# Patient Record
Sex: MALE | Race: BLACK OR AFRICAN AMERICAN
[De-identification: names, ages, dates, MRNs, and addresses within clinical notes are randomized per-mention and may not be internally consistent; named-entity substitution may affect disease eponyms.]

---

## 2017-03-09 ENCOUNTER — HOSPITAL ENCOUNTER (EMERGENCY)
Dept: HOSPITAL 62 - ER | Age: 32
LOS: 1 days | Discharge: HOME | End: 2017-03-10
Payer: COMMERCIAL

## 2017-03-09 DIAGNOSIS — L02.01: Primary | ICD-10-CM

## 2017-03-09 DIAGNOSIS — F17.210: ICD-10-CM

## 2017-03-09 DIAGNOSIS — R22.0: ICD-10-CM

## 2017-03-09 LAB
ALBUMIN SERPL-MCNC: 4.5 G/DL (ref 3.5–5)
ALP SERPL-CCNC: 76 U/L (ref 38–126)
ALT SERPL-CCNC: 35 U/L (ref 21–72)
ANION GAP SERPL CALC-SCNC: 12 MMOL/L (ref 5–19)
AST SERPL-CCNC: 24 U/L (ref 17–59)
BASOPHILS # BLD AUTO: 0.1 10^3/UL (ref 0–0.2)
BASOPHILS NFR BLD AUTO: 0.8 % (ref 0–2)
BILIRUB DIRECT SERPL-MCNC: 0 MG/DL (ref 0–0.3)
BILIRUB SERPL-MCNC: 0.7 MG/DL (ref 0.2–1.3)
BUN SERPL-MCNC: 17 MG/DL (ref 7–20)
CALCIUM: 9.9 MG/DL (ref 8.4–10.2)
CHLORIDE SERPL-SCNC: 106 MMOL/L (ref 98–107)
CO2 SERPL-SCNC: 26 MMOL/L (ref 22–30)
CREAT SERPL-MCNC: 0.92 MG/DL (ref 0.52–1.25)
EOSINOPHIL # BLD AUTO: 0.3 10^3/UL (ref 0–0.6)
EOSINOPHIL NFR BLD AUTO: 3.3 % (ref 0–6)
ERYTHROCYTE [DISTWIDTH] IN BLOOD BY AUTOMATED COUNT: 13.5 % (ref 11.5–14)
GLUCOSE SERPL-MCNC: 92 MG/DL (ref 75–110)
HCT VFR BLD CALC: 43.7 % (ref 37.9–51)
HGB BLD-MCNC: 14.8 G/DL (ref 13.5–17)
HGB HCT DIFFERENCE: 0.7
LYMPHOCYTES # BLD AUTO: 3.1 10^3/UL (ref 0.5–4.7)
LYMPHOCYTES NFR BLD AUTO: 33.5 % (ref 13–45)
MCH RBC QN AUTO: 27.9 PG (ref 27–33.4)
MCHC RBC AUTO-ENTMCNC: 33.8 G/DL (ref 32–36)
MCV RBC AUTO: 82 FL (ref 80–97)
MONOCYTES # BLD AUTO: 0.8 10^3/UL (ref 0.1–1.4)
MONOCYTES NFR BLD AUTO: 9.1 % (ref 3–13)
NEUTROPHILS # BLD AUTO: 4.9 10^3/UL (ref 1.7–8.2)
NEUTS SEG NFR BLD AUTO: 53.3 % (ref 42–78)
POTASSIUM SERPL-SCNC: 4 MMOL/L (ref 3.6–5)
PROT SERPL-MCNC: 7.3 G/DL (ref 6.3–8.2)
RBC # BLD AUTO: 5.3 10^6/UL (ref 4.35–5.55)
SODIUM SERPL-SCNC: 143.9 MMOL/L (ref 137–145)
WBC # BLD AUTO: 9.3 10^3/UL (ref 4–10.5)

## 2017-03-09 PROCEDURE — 0H91XZZ DRAINAGE OF FACE SKIN, EXTERNAL APPROACH: ICD-10-PCS | Performed by: EMERGENCY MEDICINE

## 2017-03-09 PROCEDURE — 99284 EMERGENCY DEPT VISIT MOD MDM: CPT

## 2017-03-09 PROCEDURE — 80053 COMPREHEN METABOLIC PANEL: CPT

## 2017-03-09 PROCEDURE — 85025 COMPLETE CBC W/AUTO DIFF WBC: CPT

## 2017-03-09 PROCEDURE — 70487 CT MAXILLOFACIAL W/DYE: CPT

## 2017-03-09 PROCEDURE — 10060 I&D ABSCESS SIMPLE/SINGLE: CPT

## 2017-03-09 PROCEDURE — 36415 COLL VENOUS BLD VENIPUNCTURE: CPT

## 2017-03-09 NOTE — ER DOCUMENT REPORT
ED Medical Screen (RME)





- General


Stated Complaint: FACE SWELLING


Notes: 


patient is a 31 year old male presents to the ed with facial swelling and pain. 

States that he woke up this morning with this swelling. Denies any dental pain, 

fevers, chills





Palpable area of induration and soft tissue the left cheek.  Tender to 

palpation.  No evidence of dental abscess or dental pain.  


Consulted Dr. Rocael Kang who recommended CT soft tissue with IV contrast





I have greeted and performed a rapid initial assessment of this patient. A 

comprehensive ED assessment and evaluation of the patient, analysis of test 

results and completion of the medical decision making process will be conducted 

by additional ED providers.


TRAVEL OUTSIDE OF THE [place] IN LAST 30 DAYS: No





- Related Data


Allergies/Adverse Reactions: 


 





No Known Allergies Allergy (Verified 03/09/17 19:13)


 











Past Medical History


Neurological Medical History: Reports: Hx Seizures - pediatric


Endocrine Medical History: Denies: Hx Diabetes Mellitus Type 2


Renal/ Medical History: Reports: Hx Kidney Stones.  Denies: Hx Renal 

Insufficiency


GI Medical History: Denies: Hx Gastritis, Hx Gastroesophageal Reflux Disease


Skin Medical History: Reports Hx Eczema, Reports Hx MRSA





- Immunizations


Immunizations up to date: Yes


Hx Diphtheria, Pertussis, Tetanus Vaccination: Yes - unknown





Physical Exam





- Vital signs


Vitals: 





 











Temp Pulse Resp BP Pulse Ox


 


 98.2 F   91   18   140/94 H  99 


 


 03/09/17 19:05  03/09/17 19:05  03/09/17 19:05  03/09/17 19:05  03/09/17 19:05














Course





- Vital Signs


Vital signs: 





 











Temp Pulse Resp BP Pulse Ox


 


 98.2 F   91   18   140/94 H  99 


 


 03/09/17 19:05  03/09/17 19:05  03/09/17 19:05  03/09/17 19:05  03/09/17 19:05

## 2017-03-09 NOTE — ER DOCUMENT REPORT
ED Skin Rash/Insect Bite/Abscs





- General


Chief Complaint: Swelling


Stated Complaint: FACE SWELLING


Time seen by provider: 23:52


Mode of Arrival: Ambulatory


Information source: Patient


TRAVEL OUTSIDE OF THE [place] IN LAST 30 DAYS: No





- HPI


Patient complains to provider of: Tender/swollen area


Onset: This morning


Onset/Duration: Gradual


Quality of pain: Achy, Fullness, Pressure


Severity: Moderate


Pain Level: 3


Skin Character: Erythema, Tenderness


Quality of rash: Painful


Identify cause: No


Exacerbated by: Denies


Relieved by: Denies


Similar symptoms previously: No


Recently seen / treated by doctor: No


Notes: 


Patient is a 31-year-old male who presents to the emergency room complaining of 

left facial swelling that he first noted this afternoon, it is painful, he 

denies any history of similar symptoms previously, denies any toothache, no 

fever





- Related Data


Allergies/Adverse Reactions: 


 





No Known Allergies Allergy (Verified 03/09/17 19:14)


 











Past Medical History





- General


Information source: Patient





- Social History


Smoking Status: Current Every Day Smoker


Chew tobacco use (# tins/day): No


Frequency of alcohol use: None


Drug Abuse: None


Family History: Arthritis, CAD, DM, Hypertension, Other - end stage renal 

disease


Patient has suicidal ideation: No


Patient has homicidal ideation: No


Neurological Medical History: Reports: Hx Seizures - pediatric


Endocrine Medical History: Denies: Hx Diabetes Mellitus Type 2


Renal/ Medical History: Reports: Hx Kidney Stones.  Denies: Hx Peritoneal 

Dialysis, Hx Renal Insufficiency


GI Medical History: Denies: Hx Gastritis, Hx Gastroesophageal Reflux Disease


Skin Medical History: Reports Hx Eczema, Reports Hx MRSA





- Immunizations


Immunizations up to date: Yes


Hx Diphtheria, Pertussis, Tetanus Vaccination: Yes - unknown





Review of Systems





- Review of Systems


Constitutional: No symptoms reported


EENT: No symptoms reported


Cardiovascular: No symptoms reported


Respiratory: No symptoms reported


Gastrointestinal: No symptoms reported


Genitourinary: No symptoms reported


Male Genitourinary: No symptoms reported


Musculoskeletal: No symptoms reported


Skin: See HPI


Hematologic/Lymphatic: No symptoms reported


Neurological/Psychological: No symptoms reported


-: Yes All other systems reviewed and negative





Physical Exam





- Vital signs


Vitals: 


 











Temp Pulse Resp BP Pulse Ox


 


 98.2 F   91   18   140/94 H  99 


 


 03/09/17 19:05  03/09/17 19:05  03/09/17 19:05  03/09/17 19:05  03/09/17 19:05











Interpretation: Normal





- General


General appearance: Appears well, Alert





- HEENT


Head: Normocephalic, Atraumatic


Eyes: Normal


Conjunctiva: Normal


Extraocular movements intact: Yes


Eyelashes: Normal


Pupils: PERRL


Notes: 


Patient with 1 cm tender, indurated, swollen lesion on the left side of the 

face over the angle of the mandible





- Respiratory


Respiratory status: No respiratory distress


Chest status: Nontender


Breath sounds: Normal


Chest palpation: Normal





- Cardiovascular


Rhythm: Regular


Heart sounds: Normal auscultation


Murmur: No





- Abdominal


Inspection: Normal


Distension: No distension


Bowel sounds: Normal


Tenderness: Nontender


Organomegaly: No organomegaly





- Back


Back: Normal, Nontender





- Extremities


General upper extremity: Normal inspection, Nontender, Normal color, Normal ROM

, Normal temperature


General lower extremity: Normal inspection, Nontender, Normal color, Normal ROM

, Normal temperature, Normal weight bearing.  No: Miguelangel's sign





- Neurological


Neuro grossly intact: Yes


Cognition: Normal


Orientation: AAOx4


Candace Coma Scale Eye Opening: Spontaneous


Candace Coma Scale Verbal: Oriented


[place] Coma Scale Motor: Obeys Commands


[place] Coma Scale Total: 15


Speech: Normal


Motor strength normal: LUE, RUE, LLE, RLE


Sensory: Normal





- Psychological


Associated symptoms: Normal affect, Normal mood





- Skin


Skin Temperature: Warm


Skin Moisture: Dry


Skin Color: Normal





Course





- Re-evaluation


Re-evalutation: 





03/10/17 03:10


Patient with small skin abscess on the left face, incision and drainage was 

performed, see procedure note, patient was started on antibiotics and advised 

to follow-up with a primary care provider or return if symptoms worsen, patient 

acknowledges understanding and agreement with this plan





- Vital Signs


Vital signs: 


 











Temp Pulse Resp BP Pulse Ox


 


 98.0 F   70   16   130/80 H  98 


 


 03/10/17 00:05  03/10/17 00:05  03/10/17 00:05  03/10/17 00:05  03/10/17 00:05














- Laboratory


Result Diagrams: 


 03/09/17 19:30





 03/09/17 19:30





Procedures





- Incision and Drainage


  ** Left Face


Time completed: 03:11


Type: Simple


Anesthetic type: 1% Lidocaine


mL's of anesthetic: 3


Blade size: 11


I&D procedure: Chlorprep applied


Incision Method: Incision made by scalpel


Amount/type of drainage: small amount sebaceous





Discharge





- Discharge


Clinical Impression: 


Subcutaneous abscess


Qualifiers:


 Site of cutaneous abscess: face Qualified Code(s): L02.01 - Cutaneous abscess 

of face





Condition: Stable


Disposition: HOME, SELF-CARE


Instructions:  Abscess ([facility]), Cephalexin ([facility]), Post Incision and Drainage, 

Trimethoprim-Sulfa ([facility]), Oral Narcotic Medication ([facility])


Additional Instructions: 


Follow up with your primary care provider in one to 2 days.  Return to the 

emergency room immediately if symptoms worsen or any additional concerns.


Prescriptions: 


Cephalexin Monohydrate [Keflex 500 mg Capsule] 500 mg PO BID #20 capsule


Sulfamethoxazole/Trimethoprim [Bactrim Ds Tablet] 1 each PO BID #20 tablet


Forms:  Parent Work Note, Return to Work

## 2017-03-10 VITALS — SYSTOLIC BLOOD PRESSURE: 130 MMHG | DIASTOLIC BLOOD PRESSURE: 80 MMHG

## 2017-07-06 ENCOUNTER — HOSPITAL ENCOUNTER (EMERGENCY)
Dept: HOSPITAL 62 - ER | Age: 32
Discharge: HOME | End: 2017-07-06
Payer: SELF-PAY

## 2017-07-06 VITALS — DIASTOLIC BLOOD PRESSURE: 86 MMHG | SYSTOLIC BLOOD PRESSURE: 132 MMHG

## 2017-07-06 DIAGNOSIS — R50.9: ICD-10-CM

## 2017-07-06 DIAGNOSIS — R05: ICD-10-CM

## 2017-07-06 DIAGNOSIS — R09.89: ICD-10-CM

## 2017-07-06 DIAGNOSIS — F17.200: ICD-10-CM

## 2017-07-06 DIAGNOSIS — J02.9: Primary | ICD-10-CM

## 2017-07-06 DIAGNOSIS — R51: ICD-10-CM

## 2017-07-06 PROCEDURE — 99283 EMERGENCY DEPT VISIT LOW MDM: CPT

## 2017-07-06 PROCEDURE — 87070 CULTURE OTHR SPECIMN AEROBIC: CPT

## 2017-07-06 PROCEDURE — 87880 STREP A ASSAY W/OPTIC: CPT

## 2017-07-06 PROCEDURE — 94640 AIRWAY INHALATION TREATMENT: CPT

## 2017-07-06 NOTE — ER DOCUMENT REPORT
HPI





- HPI


Patient complains to provider of: sore throat and cough


Onset: Other - 2 weeks


Onset/Duration: Gradual


Pain Level: 5


Associated Symptoms: Allergy/hay fever, Nonproductive cough, Headache, 

Rhinnorhea, Sore throat.  denies: Earache, Fever, Hurts to breath, Sinus pain/

drainage, Shortness of breath, Weakness


Exacerbated by: Other - worse at night


Relieved by: Denies


Similar symptoms previously: No


Recently seen / treated by doctor: No





- REPRODUCTIVE


Reproductive: DENIES: Pregnant:





- DERM


Skin Color: Normal





Past Medical History





- Social History


Smoking Status: Current Every Day Smoker


Family History: Arthritis, CAD, DM, Hypertension, Other - end stage renal 

disease


Patient has suicidal ideation: No


Patient has homicidal ideation: No


Neurological Medical History: Reports: Hx Seizures - pediatric


Endocrine Medical History: Denies: Hx Diabetes Mellitus Type 2


Renal/ Medical History: Reports: Hx Kidney Stones.  Denies: Hx Peritoneal 

Dialysis, Hx Renal Insufficiency


GI Medical History: Denies: Hx Gastritis, Hx Gastroesophageal Reflux Disease


Skin Medical History: Reports Hx Eczema, Reports Hx MRSA





- Immunizations


Immunizations up to date: Yes


Hx Diphtheria, Pertussis, Tetanus Vaccination: Yes - unknown





Vertical Provider Document





- CONSTITUTIONAL


Agree With Documented VS: Yes


Exam Limitations: No Limitations


General Appearance: WD/WN, No Apparent Distress





- INFECTION CONTROL


TRAVEL OUTSIDE OF THE [place] IN LAST 30 DAYS: No





- HEENT


HEENT: Atraumatic, Normal ENT Exam, Normocephalic, PERRLA


Notes: 


Uvula midline.  Airway patent. No evidence of tonsillar enlargement, 

peritonsillar abscess, retropharyngeal abscess.





- NECK


Neck: Normal Inspection.  negative: Lymphadenopathy-Left, Lymphadenopathy-Right





- RESPIRATORY


Respiratory: Breath Sounds Normal, No Respiratory Distress, Chest Non-Tender


O2 Sat by Pulse Oximetry: 99





- CARDIOVASCULAR


Cardiovascular: Regular Rate, Regular Rhythm, No Murmur


Pulses: Normal: Radial, Dorsalis pedis





- MUSCULOSKELETAL/EXTREMETIES


Musculoskeletal/Extremeties: MAEW, FROM, Non-Tender, No Edema.  negative: 

Eccymosis





- NEURO


Level of Consciousness: Awake, Alert, Appropriate


Motor/Sensory: No Motor Deficit, No Sensory Deficit





- DERM


Integumentary: Warm, Dry, No Rash





Course





- Re-evaluation


Re-evalutation: 





07/06/17 20:43


Patient is a 31-year-old male who presents emergency department complaining of 

sore throat and dry cough.  History and physical exam are consistent with 

hayfever and rhinorrhea.  Patient to be discharged home on over-the-counter 

decongestants and antihistamines.  Patient to follow-up with primary care.





- Vital Signs


Vital signs: 


 











Temp Pulse Resp BP Pulse Ox


 


 98.2 F   76   16   129/84 H  99 


 


 07/06/17 09:06  07/06/17 09:06  07/06/17 09:06  07/06/17 09:06  07/06/17 09:06














Discharge





- Discharge


Clinical Impression: 


 Sore throat, Cough





Condition: Good


Disposition: HOME, SELF-CARE


Instructions:  Sore Throat ([facility])


Additional Instructions: 


You can take pseudoephedrine, Benadryl, Claritin and TheraFlu as needed to help 

with your nasal congestion.


Prescriptions: 


Benzonatate [Tessalon Perles 100 mg Capsule] 100 mg PO Q8HP PRN #40 capsule


 PRN Reason: 


Forms:  Return to Work

## 2017-10-18 ENCOUNTER — HOSPITAL ENCOUNTER (EMERGENCY)
Dept: HOSPITAL 62 - ER | Age: 32
Discharge: HOME | End: 2017-10-18
Payer: SELF-PAY

## 2017-10-18 VITALS — SYSTOLIC BLOOD PRESSURE: 146 MMHG | DIASTOLIC BLOOD PRESSURE: 87 MMHG

## 2017-10-18 DIAGNOSIS — F17.210: ICD-10-CM

## 2017-10-18 DIAGNOSIS — R19.7: ICD-10-CM

## 2017-10-18 DIAGNOSIS — R11.10: Primary | ICD-10-CM

## 2017-10-18 PROCEDURE — 99283 EMERGENCY DEPT VISIT LOW MDM: CPT

## 2017-10-18 NOTE — ER DOCUMENT REPORT
ED General





- General


Chief Complaint: Vomiting/Diarrhea


Stated Complaint: VOMITING


Time Seen by Provider: 10/18/17 10:23


Mode of Arrival: Ambulatory


Information source: Patient


Notes: 





Patient states that yesterday he had diarrhea and vomiting.  He states the 

diarrhea is now better he no longer has nausea.  He states he does feel tired.  

However he did work the night shift.  He states that he needs a note so he can 

go back to work.  His symptoms are mild.  They are intermittent.  Nothing made 

them better or worse.  There is no known radiation of his symptoms.


TRAVEL OUTSIDE OF THE [place] IN LAST 30 DAYS: No





- Related Data


Allergies/Adverse Reactions: 


 





No Known Allergies Allergy (Verified 10/18/17 10:20)


 











Past Medical History





- General


Information source: Patient





- Social History


Smoking Status: Current Every Day Smoker


Cigarette use (# per day): Yes


Frequency of alcohol use: Occasional


Drug Abuse: None


Family History: Arthritis, CAD, DM, Hypertension, Other - end stage renal 

disease


Patient has suicidal ideation: No


Patient has homicidal ideation: No


Neurological Medical History: Reports: Hx Seizures - pediatric


Endocrine Medical History: Denies: Hx Diabetes Mellitus Type 2


Renal/ Medical History: Reports: Hx Kidney Stones.  Denies: Hx Peritoneal 

Dialysis, Hx Renal Insufficiency


GI Medical History: Denies: Hx Gastritis, Hx Gastroesophageal Reflux Disease


Skin Medical History: Reports Hx Eczema, Reports Hx MRSA





- Immunizations


Immunizations up to date: Yes


Hx Diphtheria, Pertussis, Tetanus Vaccination: Yes - unknown





Review of Systems





- Review of Systems


Constitutional: denies: Chills, Fever


Cardiovascular: denies: Chest pain, Palpitations


Respiratory: denies: Cough, Short of breath


-: Yes All other systems reviewed and negative





Physical Exam





- Vital signs


Vitals: 





 











Temp Pulse Resp BP Pulse Ox


 


 97.9 F   96   16   146/87 H  100 


 


 10/18/17 10:15  10/18/17 10:15  10/18/17 10:15  10/18/17 10:15  10/18/17 10:15











Interpretation: Hypertensive





- General


General appearance: Appears well, Alert





- HEENT


Head: Normocephalic, Atraumatic


Eyes: Normal


Pupils: PERRL





- Respiratory


Respiratory status: No respiratory distress


Chest status: Nontender


Breath sounds: Normal


Chest palpation: Normal





- Cardiovascular


Rhythm: Regular


Heart sounds: Normal auscultation


Murmur: No





- Abdominal


Inspection: Normal


Distension: No distension


Bowel sounds: Normal


Tenderness: Nontender


Organomegaly: No organomegaly





- Back


Back: Normal, Nontender





- Extremities


General upper extremity: Normal inspection, Nontender, Normal color, Normal ROM

, Normal temperature


General lower extremity: Normal inspection, Nontender, Normal color, Normal ROM

, Normal temperature, Normal weight bearing.  No: Miguelangel's sign





- Neurological


Neuro grossly intact: Yes


Cognition: Normal


Orientation: AAOx4


[place] Coma Scale Eye Opening: Spontaneous


[place] Coma Scale Verbal: Oriented


Candace Coma Scale Motor: Obeys Commands


[place] Coma Scale Total: 15


Speech: Normal


Motor strength normal: LUE, RUE, LLE, RLE


Sensory: Normal





- Psychological


Associated symptoms: Normal affect, Normal mood





- Skin


Skin Temperature: Warm


Skin Moisture: Dry


Skin Color: Normal





Course





- Vital Signs


Vital signs: 





 











Temp Pulse Resp BP Pulse Ox


 


 97.9 F   96   16   146/87 H  100 


 


 10/18/17 10:15  10/18/17 10:15  10/18/17 10:15  10/18/17 10:15  10/18/17 10:15














Discharge





- Discharge


Clinical Impression: 


 Vomiting and diarrhea





Condition: Stable


Disposition: HOME, SELF-CARE


Instructions:  Diarrhea, Nonspecific ([facility]), Antinausea Medication ([facility]), 

Vomiting ([facility])


Additional Instructions: 


Please follow-up with your primary care physician as soon as possible for 

reevaluation.





Your blood pressure is elevated today.  Please have this rechecked by her 

primary care physician within the next week.


Prescriptions: 


Ondansetron [Zofran Odt 4 mg Tablet] 1 - 2 tab PO Q4H PRN #15 tab.rapdis


 PRN Reason: For Nausea/Vomiting


Forms:  Elevated Blood Pressure, Return to Work


Referrals: 


ELSY WRIGHT MD [COMMUNITY BASED STAFF] - Follow up in 1 week

## 2018-01-06 ENCOUNTER — HOSPITAL ENCOUNTER (EMERGENCY)
Dept: HOSPITAL 62 - ER | Age: 33
Discharge: HOME | End: 2018-01-06
Payer: COMMERCIAL

## 2018-01-06 VITALS — SYSTOLIC BLOOD PRESSURE: 143 MMHG | DIASTOLIC BLOOD PRESSURE: 88 MMHG
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
## 2018-01-06 DIAGNOSIS — S61.213A: Primary | ICD-10-CM

## 2018-01-06 DIAGNOSIS — R03.0: ICD-10-CM

## 2018-01-06 PROCEDURE — 90715 TDAP VACCINE 7 YRS/> IM: CPT

## 2018-01-06 PROCEDURE — 90471 IMMUNIZATION ADMIN: CPT

## 2018-01-06 PROCEDURE — 2W3KX1Z IMMOBILIZATION OF LEFT FINGER USING SPLINT: ICD-10-PCS

## 2018-01-06 PROCEDURE — 99283 EMERGENCY DEPT VISIT LOW MDM: CPT

## 2018-01-06 NOTE — RADIOLOGY REPORT (SQ)
EXAM DESCRIPTION:  FINGER LEFT



COMPLETED DATE/TIME:  1/6/2018 8:15 pm



REASON FOR STUDY:  finger lac, fell injuring finger



COMPARISON:  None.



NUMBER OF VIEWS:  Three views.



TECHNIQUE:  AP, lateral, and oblique images acquired of the left third finger.



LIMITATIONS:  None.



FINDINGS:  MINERALIZATION: Normal.

BONES: No acute fracture or dislocation.  No worrisome bone lesions.

SOFT TISSUES: No soft tissue swelling.  No foreign body.

OTHER: No other significant finding.



IMPRESSION:  NO RADIOGRAPHIC EVIDENCE OF ACUTE INJURY.



COMMENT:   SITE OF TRAUMA/COMPLAINT MARKED/STAMP COMPLETED: YES.



TECHNICAL DOCUMENTATION:  JOB ID:  [id number]

 2011 [employer]- All Rights Reserved

## 2018-01-06 NOTE — ER DOCUMENT REPORT
HPI





- HPI


Patient complains to provider of: finger injury


Onset: Other - 4 days


Onset/Duration: Persistent


Quality of pain: Achy


Pain Level: 4


Context: 





Patient states that he cut his left third finger while at work with a knife 4 

days ago.  Patient states he was unable to come to the hospital due to the 

weather.  Patient states yesterday he slipped and fell on ice hitting his 

finger on the ground.  Patient is right-hand dominant.  Patient denies any 

fever.


Associated Symptoms: Other - Left third finger injury


Exacerbated by: Movement


Relieved by: Denies


Similar symptoms previously: No


Recently seen / treated by doctor: No





- ROS


ROS below otherwise negative: Yes


Systems Reviewed and Negative: Yes All other systems reviewed and negative





- CONSTITUTIONAL


Constitutional: DENIES: Fever, Chills





- REPRODUCTIVE


Reproductive: DENIES: Pregnant:





- MUSCULOSKELETAL


Musculoskeletal: REPORTS: Extremity pain, Swelling





- DERM


Skin Problems: Laceration





Past Medical History





- General


Information source: Patient





- Social History


Smoking Status: Never Smoker


Frequency of alcohol use: None


Drug Abuse: None


Occupation: [employer]


Family History: Reviewed & Not Pertinent, Arthritis, CAD, DM, Hypertension, 

Other - end stage renal disease


Neurological Medical History: Reports: Hx Seizures - pediatric


Endocrine Medical History: Denies: Hx Diabetes Mellitus Type 2


Renal/ Medical History: Reports: Hx Kidney Stones.  Denies: Hx Peritoneal 

Dialysis, Hx Renal Insufficiency


GI Medical History: Denies: Hx Gastritis, Hx Gastroesophageal Reflux Disease


Skin Medical History: Reports Hx Eczema, Reports Hx MRSA


Surgical Hx: Negative





- Immunizations


Immunizations up to date: Yes


Hx Diphtheria, Pertussis, Tetanus Vaccination: Yes - unknown





Vertical Provider Document





- CONSTITUTIONAL


Agree With Documented VS: Yes


Exam Limitations: No Limitations


General Appearance: WD/WN, No Apparent Distress





- INFECTION CONTROL


TRAVEL OUTSIDE OF THE [place] IN LAST 30 DAYS: No





- HEENT


HEENT: Atraumatic, Normocephalic





- NECK


Neck: Normal Inspection





- RESPIRATORY


Respiratory: No Respiratory Distress


O2 Sat by Pulse Oximetry: 100





- CARDIOVASCULAR


Pulses: Normal: Radial





- MUSCULOSKELETAL/EXTREMETIES


Musculoskeletal/Extremeties: MAEW, Tender - Left third finger tenderness, 

laceration overlying the dorsal aspect of PIP joint, patient unable to fully 

extend finger at the PIP joint, patient with tenderness over DIP joint as well, 

DIP joint in unopposed extended position., Edema - 1+.  negative: Eccymosis





- NEURO


Level of Consciousness: Awake, Alert, Appropriate





- DERM


Integumentary: Warm, Dry, Laceration - 1 center laceration to dorsal aspect of 

left third finger PIP joint





Course





- Re-evaluation


Re-evalutation: 





01/06/18 20:32


Consulted with Dr. Goyal regarding patient presentation.  Patient with findings 

concerning for both flexor as well as extensor tendon injury to the left third 

finger.  Recommends orthopedic follow-up.  Recommends splinting finger in a 

neutral position of comfort





Discussed concerned about possible flexor as well as extensor tendon injury as 

well as possible infection to finger.  Patient encouraged to follow-up with 

hand surgeon on Monday for further evaluation.  Discussed worsening symptoms 

that patient should return immediately for.  Patient verbalized understanding 

and is agreeable with plan of care.


01/06/18 20:41








- Vital Signs


Vital signs: 


 











Temp Pulse Resp BP Pulse Ox


 


 98.2 F   92   16   143/88 H  100 


 


 01/06/18 17:58  01/06/18 17:58  01/06/18 17:58  01/06/18 17:58  01/06/18 17:58














- Diagnostic Test


Radiology reviewed: Image reviewed





Procedures





- Immobilization


  ** Left 3rd digit


Pre-Proc Neuro Vasc Exam: Normal


Immobilizer type: Finger splint (Static)


Performed by: Provider assisted, PCT


Post-Proc Neuro Vasc Exam: Normal


Alignment checked and good: Yes





Discharge





- Discharge


Clinical Impression: 


 Elevated blood pressure reading, Tendon injury





Finger laceration


Qualifiers:


 Encounter type: initial encounter Finger: middle finger Damage to nail status: 

without damage Foreign body presence: without foreign body Laterality: left 

Qualified Code(s): S61.213A - Laceration without foreign body of left middle 

finger without damage to nail, initial encounter





Condition: Stable


Disposition: HOME, SELF-CARE


Instructions:  Antibiotic Ointment Protection ([facility]), Non-Sutured Laceration ([facility]

), Prophylactic Antibiotic ([facility]), Tendon Laceration Referral ([facility]), Tetanus 

Immunization Given ([facility])


Additional Instructions: 


Return immediately for any new or worsening symptoms





Followup with your primary care provider, call tomorrow to make a followup 

appointment





Change dressing to wound daily, monitor for any signs of infection.  Turn for 

any concerning signs of infection such as fever, swelling, redness, purulent 

drainage.





Follow-up with a hand surgeon, call Monday morning for an appointment


Prescriptions: 


Cephalexin Monohydrate [Keflex 500 mg Capsule] 500 mg PO Q6H 5 Days  capsule


Naproxen [Naprosyn 250 Nmg Tablet] 1 tab PO BID #14 tablet


Forms:  Elevated Blood Pressure, Return to Work


Referrals: 


WALTER MINA DO [ACTIVE STAFF] - 01/08/18

## 2018-01-09 ENCOUNTER — HOSPITAL ENCOUNTER (OUTPATIENT)
Dept: HOSPITAL 62 - OROUT | Age: 33
Discharge: HOME | End: 2018-01-09
Attending: ORTHOPAEDIC SURGERY
Payer: COMMERCIAL

## 2018-01-09 VITALS — SYSTOLIC BLOOD PRESSURE: 130 MMHG | DIASTOLIC BLOOD PRESSURE: 84 MMHG

## 2018-01-09 DIAGNOSIS — S66.123A: Primary | ICD-10-CM

## 2018-01-09 DIAGNOSIS — I10: ICD-10-CM
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
## 2018-01-09 LAB
ADD MANUAL DIFF: NO
ANION GAP SERPL CALC-SCNC: 8 MMOL/L (ref 5–19)
APPEARANCE UR: CLEAR
APTT PPP: YELLOW S
BASOPHILS # BLD AUTO: 0.1 10^3/UL (ref 0–0.2)
BASOPHILS NFR BLD AUTO: 0.8 % (ref 0–2)
BILIRUB UR QL STRIP: NEGATIVE
BUN SERPL-MCNC: 17 MG/DL (ref 7–20)
CALCIUM: 10 MG/DL (ref 8.4–10.2)
CHLORIDE SERPL-SCNC: 105 MMOL/L (ref 98–107)
CO2 SERPL-SCNC: 28 MMOL/L (ref 22–30)
EOSINOPHIL # BLD AUTO: 0.1 10^3/UL (ref 0–0.6)
EOSINOPHIL NFR BLD AUTO: 1.9 % (ref 0–6)
ERYTHROCYTE [DISTWIDTH] IN BLOOD BY AUTOMATED COUNT: 13.7 % (ref 11.5–14)
GLUCOSE SERPL-MCNC: 88 MG/DL (ref 75–110)
GLUCOSE UR STRIP-MCNC: NEGATIVE MG/DL
HCT VFR BLD CALC: 44 % (ref 37.9–51)
HGB BLD-MCNC: 14.9 G/DL (ref 13.5–17)
KETONES UR STRIP-MCNC: NEGATIVE MG/DL
LYMPHOCYTES # BLD AUTO: 2.7 10^3/UL (ref 0.5–4.7)
LYMPHOCYTES NFR BLD AUTO: 38.1 % (ref 13–45)
MCH RBC QN AUTO: 27.6 PG (ref 27–33.4)
MCHC RBC AUTO-ENTMCNC: 33.9 G/DL (ref 32–36)
MCV RBC AUTO: 81 FL (ref 80–97)
MONOCYTES # BLD AUTO: 0.6 10^3/UL (ref 0.1–1.4)
MONOCYTES NFR BLD AUTO: 7.9 % (ref 3–13)
NEUTROPHILS # BLD AUTO: 3.6 10^3/UL (ref 1.7–8.2)
NEUTS SEG NFR BLD AUTO: 51.3 % (ref 42–78)
NITRITE UR QL STRIP: NEGATIVE
PH UR STRIP: 7 [PH] (ref 5–9)
PLATELET # BLD: 193 10^3/UL (ref 150–450)
POTASSIUM SERPL-SCNC: 4.5 MMOL/L (ref 3.6–5)
PROT UR STRIP-MCNC: NEGATIVE MG/DL
RBC # BLD AUTO: 5.42 10^6/UL (ref 4.35–5.55)
SODIUM SERPL-SCNC: 140.8 MMOL/L (ref 137–145)
SP GR UR STRIP: 1.02
TOTAL CELLS COUNTED % (AUTO): 100 %
UROBILINOGEN UR-MCNC: 2 MG/DL (ref ?–2)
WBC # BLD AUTO: 7 10^3/UL (ref 4–10.5)

## 2018-01-09 PROCEDURE — 85025 COMPLETE CBC W/AUTO DIFF WBC: CPT

## 2018-01-09 PROCEDURE — 73140 X-RAY EXAM OF FINGER(S): CPT

## 2018-01-09 PROCEDURE — 93005 ELECTROCARDIOGRAM TRACING: CPT

## 2018-01-09 PROCEDURE — 0LQ80ZZ REPAIR LEFT HAND TENDON, OPEN APPROACH: ICD-10-PCS | Performed by: ORTHOPAEDIC SURGERY

## 2018-01-09 PROCEDURE — 81001 URINALYSIS AUTO W/SCOPE: CPT

## 2018-01-09 PROCEDURE — 93010 ELECTROCARDIOGRAM REPORT: CPT

## 2018-01-09 PROCEDURE — 80048 BASIC METABOLIC PNL TOTAL CA: CPT

## 2018-01-09 PROCEDURE — 01810 ANES PX NRV MUSC F/ARM WRST: CPT

## 2018-01-09 PROCEDURE — 36415 COLL VENOUS BLD VENIPUNCTURE: CPT

## 2018-01-09 PROCEDURE — 26418 REPAIR FINGER TENDON: CPT

## 2018-01-09 PROCEDURE — 71045 X-RAY EXAM CHEST 1 VIEW: CPT

## 2018-01-09 PROCEDURE — C1769 GUIDE WIRE: HCPCS

## 2018-01-09 NOTE — RADIOLOGY REPORT (SQ)
EXAM DESCRIPTION:  CHEST SINGLE VIEW



COMPLETED DATE/TIME:  1/9/2018 7:39 pm



REASON FOR STUDY:  Rule out aspiration



COMPARISON:  1/9/2018.



EXAM PARAMETERS:  NUMBER OF VIEWS: One view.

TECHNIQUE: Single frontal radiographic view of the chest acquired.

RADIATION DOSE: NA

LIMITATIONS: None.



FINDINGS:  LUNGS AND PLEURA: No opacities, masses or pneumothorax. No pleural effusion.

MEDIASTINUM AND HILAR STRUCTURES: No masses.  Contour normal.

HEART AND VASCULAR STRUCTURES: Heart normal in size.  Normal vasculature.

BONES: No acute findings.

HARDWARE: None in the chest.

OTHER: No other significant finding.



IMPRESSION:  NO ACUTE RADIOGRAPHIC FINDING IN THE CHEST.



TECHNICAL DOCUMENTATION:  JOB ID:  [id number]

 2011 [employer]- All Rights Reserved

## 2018-01-09 NOTE — RADIOLOGY REPORT (SQ)
EXAM DESCRIPTION:  NO CHG FLUORO; FINGER LEFT



COMPLETED DATE/TIME:  1/9/2018 7:41 pm



REASON FOR STUDY:  PERC PINNING LT FINGER S66.123A  LACERAT FLEXOR MUSC/FASC/TEND L MID FNGR AT WRS/H
ND



COMPARISON:  1/6/2018.



FLUOROSCOPY TIME:  5 seconds.

3 images saved to PACS.



TECHNIQUE:  Intra-operative images acquired during surgical procedure to evaluate progress.

NUMBER OF IMAGES: 3 images.



LIMITATIONS:  None.



FINDINGS:  Images of the finger acquired during hardware placement.



IMPRESSION:  IMAGE(S) OBTAINED DURING PROCEDURE.



COMMENT:  Quality :  Final reports for procedures using fluoroscopy that document radiation exp
osure indices, or exposure time and number of fluorographic images (if radiation exposure indices are
 not available)

Please consult full operative report of the attending physician for description of the procedure.



TECHNICAL DOCUMENTATION:  JOB ID:  [id number]

 2011 [employer]- All Rights Reserved

## 2018-01-09 NOTE — RADIOLOGY REPORT (SQ)
EXAM DESCRIPTION:  CHEST SINGLE VIEW



COMPLETED DATE/TIME:  1/9/2018 3:20 pm



REASON FOR STUDY:  PREOP



COMPARISON:  Chest films 4/27/2016



EXAM PARAMETERS:  NUMBER OF VIEWS: One view.

TECHNIQUE: Single frontal radiographic view of the chest acquired.

RADIATION DOSE: NA

LIMITATIONS: None.



FINDINGS:  LUNGS AND PLEURA: No opacities, masses or pneumothorax. No pleural effusion.

MEDIASTINUM AND HILAR STRUCTURES: No masses.  Contour normal.

HEART AND VASCULAR STRUCTURES: Heart normal in size.  Normal vasculature.

BONES: No acute findings.

HARDWARE: None in the chest.

OTHER: No other significant finding.



IMPRESSION:  NO ACUTE RADIOGRAPHIC FINDING IN THE CHEST.



TECHNICAL DOCUMENTATION:  JOB ID:  [id number]

 2011 [employer]- All Rights Reserved

## 2018-01-09 NOTE — PDOC DISCHARGE SUMMARY
Discharge Summary (SDC)





- Discharge


Final Diagnosis: 





Extensor Tendon Laceration.


Date of Surgery: 01/09/18


Discharge Date: 01/09/18


Condition: Good


Treatment or Instructions: 





Schedule Follow Up w/ Dr. Nikko Mixon @ [facility] to be seen 

in 10-14 days or as scheduled


[place]: [phone] 


[place]: [phone]


[place]: [phone] 





Ice and elevate





Keep splint clean/dry/intact.





If your fingers become numb please unwrap the Ace wrap but leave the splint in 

place, if the sensation does not return within 30 minutes please return to the 

emergency department.











Please use ibuprofen (Motrin or Advil) 600-800 mg every 8 hours as needed for 

pain or fever.  You may also use acetaminophen (Tylenol) 1000 mg every 4-6 

hours as needed for pain or fever.  Please be aware that many medications 

contain acetaminophen, do not exceed a total of 1000 mg of acetaminophen every 

6 hours.  





If ibuprofen and acetaminophen are not sufficient for your pain you may take 

the Percocet.  Please be aware that the Percocet does contain Tylenol.





Stool softener of choice when on pain medication.


Prescriptions: 


Hydrocodone/Acetaminophen [Norco 5-325 mg Tablet] 1 tab PO Q6 PRN #25 tablet


 PRN Reason: 


Discharge Diet: As Tolerated


Discharge Activity: No Lifting Over 10 Pounds, No Lifting/Push/Pulling


Report the Following to Your Physician Immediately: Fever over 101 Degrees, 

Unusual Bleeding, Redness, Swelling, Warmth, Increased Soreness

## 2018-01-09 NOTE — OPERATIVE REPORT
Operative Report


DATE OF SURGERY: 01/09/18


PREOPERATIVE DIAGNOSIS: LEFT middle Finger Extensor Tendon Laceration


POSTOPERATIVE DIAGNOSIS: Same


OPERATION: Zone III Extensor Tendon Repair w/ Transarticular Pinning LEFT 

middle Finger


SURGEON: WALTER MNIA


ANESTHESIA: LMAC


COMPLICATIONS: 





None


ESTIMATED BLOOD LOSS: Minimal


PROCEDURE: 





Indication for above procedure:





32-year-old male who sustained a laceration to his right middle finger while at 

work.  Since then he has been unable to fully extend his finger.  He presented 

to my office which point we discussed treatment options given his lack of 

extension and young age I recommended operative intervention which includes 

repair and further patient's extensor tendon.  Risks and benefits were 

explained to the patient who verbalized understanding consented for the 

procedure.  





Procedure In Detail:





Patient was seen and evaluated in the preoperative holding area.  The LEFT 

upper extremity was initialized and marked.  Patient received 2g of Ancef IV 

for bacterial prophylaxis.  Patient was taken back to the operative room where 

transferred to the operative table. ]A surgical team debriefing was performed 

ensuring all instrumentation was available, the surgical procedure was 

discussed with possible concerns reviewed.  A digital block was performed 

utilizing 10 mL 50:50 mixture of 0.5% Marcaine and 1% lidocaine without 

epinephrine.  The upper extremity was prepped with chlorhexidine and alcohol 

and draped in a sterile fashion.   A timeout was done identifying correct 

patient, procedure and extremity everyone in attendance agree with this and 

verbalized no concerns.  A digital tourniquet was placed








Patient's previous incision was extended proximally and distally by 1 cm.  

Blunt dissection was performed as a small peripheral bleeding was coagulated 

bipolar cautery.  Exploration demonstrated 95% disruption of the extensor 

tendon at zone III that extended into the joint.  The wound was then copiously 

irrigated with normal saline.  I then proceeded with pinning of the PIP joint 

in extension.  A 0.045 K wires placed obliquely across the PIP joint and 

confirmed with C-arm fluoroscopy.  The tip was cut outside the skin to allow 

for easy removal postoperatively.  I then debrided the wound edges of the 

patient's laceration and tendon injury.  The zone 3 central slip injury was 

reapproximated with a running 4-0 FiberWire Silverskiold horizontal mattress 

stitch.  Any small peripheral bleeding was coagulated with bipolar cautery.  

Wound was once again irrigated with normal saline.  Skin was closed with 

interrupted 4-0 nylon suture.  Patient was placed in a volar plaster splint 

maintaining PIP joint extension leaving the DIP joint free.





Sponge counts, instrument counts, needle counts counts were correct.  Patient 

was then awoken from anesthesia.  Transferred from the operating room table to 

the operating room stretcher.  There was no intraoperative complications 

patient tolerated procedure well stable to PACU.








Postoperative plan:





Patient will follow-up the office in 2 weeks for wound check.  Plan will be for 

pin removal 6 weeks postoperatively.  We will set him up for occupational 

therapy prior to pin removal to begin MP and DIP joint range of motion.

## 2018-01-09 NOTE — RADIOLOGY REPORT (SQ)
EXAM DESCRIPTION:  NO CHG FLUORO; FINGER LEFT



COMPLETED DATE/TIME:  1/9/2018 7:41 pm



REASON FOR STUDY:  PERC PINNING LT FINGER S66.123A  LACERAT FLEXOR MUSC/FASC/TEND L MID FNGR AT WRS/H
ND



COMPARISON:  1/6/2018.



FLUOROSCOPY TIME:  5 seconds.

3 images saved to PACS.



TECHNIQUE:  Intra-operative images acquired during surgical procedure to evaluate progress.

NUMBER OF IMAGES: 3 images.



LIMITATIONS:  None.



FINDINGS:  Images of the finger acquired during hardware placement.



IMPRESSION:  IMAGE(S) OBTAINED DURING PROCEDURE.



COMMENT:  Quality :  Final reports for procedures using fluoroscopy that document radiation exp
osure indices, or exposure time and number of fluorographic images (if radiation exposure indices are
 not available)

Please consult full operative report of the attending physician for description of the procedure.



TECHNICAL DOCUMENTATION:  JOB ID:  [id number]

 2011 [employer]- All Rights Reserved

## 2018-08-05 ENCOUNTER — HOSPITAL ENCOUNTER (EMERGENCY)
Dept: HOSPITAL 62 - ER | Age: 33
LOS: 1 days | Discharge: HOME | End: 2018-08-06
Payer: SELF-PAY

## 2018-08-05 VITALS — DIASTOLIC BLOOD PRESSURE: 80 MMHG | SYSTOLIC BLOOD PRESSURE: 133 MMHG

## 2018-08-05 DIAGNOSIS — J02.9: ICD-10-CM

## 2018-08-05 DIAGNOSIS — H66.90: Primary | ICD-10-CM

## 2018-08-05 DIAGNOSIS — H92.03: ICD-10-CM

## 2018-08-05 DIAGNOSIS — R13.10: ICD-10-CM

## 2018-08-05 DIAGNOSIS — E11.9: ICD-10-CM

## 2018-08-05 PROCEDURE — 99283 EMERGENCY DEPT VISIT LOW MDM: CPT

## 2018-08-05 PROCEDURE — 71046 X-RAY EXAM CHEST 2 VIEWS: CPT

## 2018-08-05 NOTE — RADIOLOGY REPORT (SQ)
EXAM DESCRIPTION:

XR CHEST 2 VIEWS



COMPLETED DATE/TME:  08/05/2018 23:06



CLINICAL HISTORY:

32 years Male, diff breathing



COMPARISON:

1.9.18



FINDINGS:



Adequate lung volume, clear parenchyma, normal cardiac

silhouette, and intact bony thorax.



IMPRESSION:



No acute cardiopulmonary findings.

## 2018-08-05 NOTE — ER DOCUMENT REPORT
HPI





- HPI


Patient complains to provider of: Ear pain


Onset: Other - 4 days


Onset/Duration: Persistent


Quality of pain: Achy


Pain Level: 4


Context: 





Pt presents complaining of ear pain.  Patient states initially his left ear 

hurts been his right ear and now both ears are hurting.  Patient states that he 

is also had some difficulty breathing, although denies any difficulty breathing 

at this time.  Patient states that whenever he lies down he has difficulty 

swallowing.  Patient also reports headache although states that the headache is 

resolved at this time.  Patient denies any nausea or vomiting.  Patient's only 

pain complaints at this time are ear pain and difficulty swallowing when lying 

down.


Associated Symptoms: Earache, Sore throat.  denies: Chest pain, Nonproductive 

cough, Productive cough, Nausea, Vomiting


Similar symptoms previously: No


Recently seen / treated by doctor: No





- ROS


ROS below otherwise negative: Yes


Systems Reviewed and Negative: Yes All other systems reviewed and negative





- CONSTITUTIONAL


Constitutional: DENIES: Fever, Chills





- EENT


EENT: REPORTS: Sore Throat, Ear Pain





- NEURO


Neurology: REPORTS: Headache - Now resolved





- CARDIOVASCULAR


Cardiovascular: DENIES: Chest pain





- RESPIRATORY


Respiratory: REPORTS: Trouble Breathing - Now resolved.  DENIES: Coughing





- GASTROINTESTINAL


Gastrointestinal: DENIES: Abdominal Pain, Nausea, Patient vomiting





- REPRODUCTIVE


Reproductive: DENIES: Pregnant:





- MUSCULOSKELETAL


Musculoskeletal: DENIES: Back Pain, Neck Pain





- DERM


Skin Color: Normal


Skin Problems: None





Past Medical History





- General


Information source: Patient





- Social History


Smoking Status: Never Smoker


Frequency of alcohol use: None


Drug Abuse: None


Occupation: Food processing


Family History: Reviewed & Not Pertinent, Arthritis, CAD, DM, Hypertension, 

Other - end stage renal disease


Neurological Medical History: Denies: Hx Cerebrovascular Accident, Hx Seizures


Endocrine Medical History: Denies: Hx Diabetes Mellitus Type 2


Renal/ Medical History: Reports: Hx Kidney Stones.  Denies: Hx Peritoneal 

Dialysis, Hx Renal Insufficiency


GI Medical History: Denies: Hx Gastritis, Hx Gastroesophageal Reflux Disease


Musculoskeletal Medical History: Denies Hx Arthritis


Skin Medical History: Reports Hx Eczema, Reports Hx MRSA


Past Surgical History: Reports: Hx Orthopedic Surgery





- Immunizations


Immunizations up to date: Yes


Hx Diphtheria, Pertussis, Tetanus Vaccination: Yes





Vertical Provider Document





- CONSTITUTIONAL


Agree With Documented VS: Yes


Exam Limitations: No Limitations


General Appearance: WD/WN, No Apparent Distress





- INFECTION CONTROL


TRAVEL OUTSIDE OF THE [place] IN LAST 30 DAYS: No





- HEENT


HEENT: Atraumatic, Normocephalic, Pharyngeal Tenderness, Pharyngeal Erythema, 

Tympanic Membrane Bulging - left.  negative: Pharyngeal Exudate


Notes: 





Purulent effusion noted on the left, no mastoid tenderness or swelling, no 

swelling noted to the bilateral external auditory canals





- NECK


Neck: Normal Inspection, Supple.  negative: Lymphadenopathy-Left, 

Lymphadenopathy-Right





- RESPIRATORY


Respiratory: Breath Sounds Normal, No Respiratory Distress, Chest Non-Tender.  

negative: Rales, Rhonchi, Wheezing





- CARDIOVASCULAR


Cardiovascular: Regular Rate, Regular Rhythm, No Murmur





- GI/ABDOMEN


Gastrointestinal: Abdomen Soft, Abdomen Non-Tender, No Organomegaly





- MUSCULOSKELETAL/EXTREMETIES


Musculoskeletal/Extremeties: MAEW, FROM





- NEURO


Level of Consciousness: Awake, Alert, Appropriate


Motor/Sensory: No Motor Deficit, No Sensory Deficit


Notes: 





No focal neurologic deficit





- DERM


Integumentary: Warm, Dry, No Rash





Course





- Re-evaluation


Re-evalutation: 





08/06/18 


Patient's respirations even and unlabored.  Patient without any dyspnea 

symptoms at this time.  Patient denies any chest pain, headache nausea or 

vomiting at this time.  Patient nontoxic in appearance.  No concern for otitis 

externa or mastoiditis.  Discussed worsening symptoms that patient should 

return immediately for.  Patient verbalized understanding and agrees with plan 

of care.








- Vital Signs


Vital signs: 


 











Temp Pulse Resp BP Pulse Ox


 


 99.4 F   92   20   133/80 H  100 


 


 08/05/18 22:14  08/05/18 22:14  08/05/18 22:14  08/05/18 22:14  08/05/18 22:14














- Diagnostic Test


Radiology reviewed: Reports reviewed





Discharge





- Discharge


Clinical Impression: 


 Sore throat





Otitis media


Qualifiers:


 Otitis media type: unspecified Chronicity: acute Qualified Code(s): H66.90 - 

Otitis media, unspecified, unspecified ear





Condition: Stable


Disposition: HOME, SELF-CARE


Instructions:  Amoxicillin ([facility]), Oral Narcotic Medication ([facility]), Otitis Media (

[facility]), Sore Throat ([facility])


Additional Instructions: 


Return immediately for any new or worsening symptoms





Followup with your primary care provider, call tomorrow to make a followup 

appointment








Prescriptions: 


Amoxicillin 500 mg PO TID #30 tablet


Naproxen [Naprosyn 250 Nmg Tablet] 1 tab PO BID #14 tablet


Forms:  Return to Work


Referrals: 


[facility] [Provider Group] - Follow up as needed


[facility] [Provider Group] - Follow up as needed

## 2018-11-19 ENCOUNTER — HOSPITAL ENCOUNTER (EMERGENCY)
Dept: HOSPITAL 62 - ER | Age: 33
Discharge: HOME | End: 2018-11-19
Payer: SELF-PAY

## 2018-11-19 VITALS — SYSTOLIC BLOOD PRESSURE: 139 MMHG | DIASTOLIC BLOOD PRESSURE: 97 MMHG

## 2018-11-19 DIAGNOSIS — R31.9: ICD-10-CM

## 2018-11-19 DIAGNOSIS — N20.0: Primary | ICD-10-CM

## 2018-11-19 DIAGNOSIS — R10.32: ICD-10-CM

## 2018-11-19 DIAGNOSIS — R11.0: ICD-10-CM

## 2018-11-19 DIAGNOSIS — R10.9: ICD-10-CM

## 2018-11-19 LAB
ADD MANUAL DIFF: NO
ALBUMIN SERPL-MCNC: 4.3 G/DL (ref 3.5–5)
ALP SERPL-CCNC: 77 U/L (ref 38–126)
ALT SERPL-CCNC: 27 U/L (ref 21–72)
ANION GAP SERPL CALC-SCNC: 11 MMOL/L (ref 5–19)
APPEARANCE UR: (no result)
APTT PPP: YELLOW S
AST SERPL-CCNC: 24 U/L (ref 17–59)
BASOPHILS # BLD AUTO: 0.1 10^3/UL (ref 0–0.2)
BASOPHILS NFR BLD AUTO: 0.6 % (ref 0–2)
BILIRUB DIRECT SERPL-MCNC: 0.3 MG/DL (ref 0–0.4)
BILIRUB SERPL-MCNC: 0.4 MG/DL (ref 0.2–1.3)
BILIRUB UR QL STRIP: NEGATIVE
BUN SERPL-MCNC: 20 MG/DL (ref 7–20)
CALCIUM: 9.4 MG/DL (ref 8.4–10.2)
CHLORIDE SERPL-SCNC: 108 MMOL/L (ref 98–107)
CO2 SERPL-SCNC: 25 MMOL/L (ref 22–30)
EOSINOPHIL # BLD AUTO: 0.1 10^3/UL (ref 0–0.6)
EOSINOPHIL NFR BLD AUTO: 0.5 % (ref 0–6)
ERYTHROCYTE [DISTWIDTH] IN BLOOD BY AUTOMATED COUNT: 13.8 % (ref 11.5–14)
GLUCOSE SERPL-MCNC: 138 MG/DL (ref 75–110)
GLUCOSE UR STRIP-MCNC: NEGATIVE MG/DL
HCT VFR BLD CALC: 44 % (ref 37.9–51)
HGB BLD-MCNC: 14.9 G/DL (ref 13.5–17)
KETONES UR STRIP-MCNC: NEGATIVE MG/DL
LYMPHOCYTES # BLD AUTO: 3.2 10^3/UL (ref 0.5–4.7)
LYMPHOCYTES NFR BLD AUTO: 28 % (ref 13–45)
MCH RBC QN AUTO: 27.9 PG (ref 27–33.4)
MCHC RBC AUTO-ENTMCNC: 33.9 G/DL (ref 32–36)
MCV RBC AUTO: 82 FL (ref 80–97)
MONOCYTES # BLD AUTO: 0.8 10^3/UL (ref 0.1–1.4)
MONOCYTES NFR BLD AUTO: 6.7 % (ref 3–13)
NEUTROPHILS # BLD AUTO: 7.3 10^3/UL (ref 1.7–8.2)
NEUTS SEG NFR BLD AUTO: 64.2 % (ref 42–78)
NITRITE UR QL STRIP: NEGATIVE
PH UR STRIP: 5 [PH] (ref 5–9)
PLATELET # BLD: 260 10^3/UL (ref 150–450)
POTASSIUM SERPL-SCNC: 4.2 MMOL/L (ref 3.6–5)
PROT SERPL-MCNC: 7 G/DL (ref 6.3–8.2)
PROT UR STRIP-MCNC: 30 MG/DL
RBC # BLD AUTO: 5.34 10^6/UL (ref 4.35–5.55)
SODIUM SERPL-SCNC: 144.3 MMOL/L (ref 137–145)
SP GR UR STRIP: 1.03
TOTAL CELLS COUNTED % (AUTO): 100 %
UROBILINOGEN UR-MCNC: 2 MG/DL (ref ?–2)
WBC # BLD AUTO: 11.4 10^3/UL (ref 4–10.5)

## 2018-11-19 PROCEDURE — 80053 COMPREHEN METABOLIC PANEL: CPT

## 2018-11-19 PROCEDURE — 36415 COLL VENOUS BLD VENIPUNCTURE: CPT

## 2018-11-19 PROCEDURE — 96375 TX/PRO/DX INJ NEW DRUG ADDON: CPT

## 2018-11-19 PROCEDURE — 99284 EMERGENCY DEPT VISIT MOD MDM: CPT

## 2018-11-19 PROCEDURE — 85025 COMPLETE CBC W/AUTO DIFF WBC: CPT

## 2018-11-19 PROCEDURE — 81001 URINALYSIS AUTO W/SCOPE: CPT

## 2018-11-19 PROCEDURE — 96374 THER/PROPH/DIAG INJ IV PUSH: CPT

## 2018-11-19 NOTE — ER DOCUMENT REPORT
ED General





- General


Chief Complaint: Abdominal Pain


Stated Complaint: FLANK PAIN


Time Seen by Provider: 11/19/18 01:00


Notes: 





Patient is a 33-year-old male without chronic medical problems, does have a 

history of kidney stones in the past who presents with severe flank and left 

lower abdominal pain that woke him from sleep.  The patient is initially unable 

to sit still in the room.  Describes it as a severe, stabbing, constant pain to 

the affected areas.  Nothing improves or worsens this pain.  States this does 

feel similar to when he had a kidney stone in the past.  Denies any history of 

complicated kidney stones.  Denies any testicular pain.  He notes that he has 

been nauseated but has not vomited.  Has not contacted his general doctor 

regarding today's concerns.  Denies fever or constitutional symptoms.


TRAVEL OUTSIDE OF THE [place] IN LAST 30 DAYS: No





- Related Data


Allergies/Adverse Reactions: 


 





No Known Allergies Allergy (Verified 01/09/18 15:11)


 











Past Medical History





- General


Information source: Patient





- Social History


Smoking Status: Never Smoker


Frequency of alcohol use: None


Drug Abuse: None


Lives with: Family


Family History: Reviewed & Not Pertinent, Arthritis, CAD, DM, Hypertension, 

Other - end stage renal disease





- Past Medical History


Cardiac Medical History: 


   Denies: Hx Coronary Artery Disease, Hx Heart Attack, Hx Hypertension


Pulmonary Medical History: 


   Denies: Hx Asthma, Hx Bronchitis, Hx COPD, Hx Pneumonia


Neurological Medical History: Denies: Hx Cerebrovascular Accident, Hx Seizures


Endocrine Medical History: Denies: Hx Diabetes Mellitus Type 2


Renal/ Medical History: Reports: Hx Kidney Stones.  Denies: Hx Peritoneal 

Dialysis, Hx Renal Insufficiency


GI Medical History: Denies: Hx Gastritis, Hx Gastroesophageal Reflux Disease


Musculoskeletal Medical History: Denies Hx Arthritis


Skin Medical History: Reports Hx Eczema, Reports Hx MRSA


Past Surgical History: Reports: Hx Orthopedic Surgery





- Immunizations


Immunizations up to date: Yes


Hx Diphtheria, Pertussis, Tetanus Vaccination: Yes





Review of Systems





- Review of Systems


Notes: 





Constitutional: Negative for fever.


HENT: Negative for sore throat.


Eyes: Negative for visual changes.


Cardiovascular: Negative for chest pain.


Respiratory: Negative for shortness of breath.


Gastrointestinal: Positive for abdominal and flank pain.  Positive for nausea


Genitourinary: Negative for dysuria.


Musculoskeletal: Negative for back pain.


Skin: Negative for rash.


Neurological: Negative for headaches, weakness or numbness.





10 point ROS negative except as marked above and in HPI.





Physical Exam





- Vital signs


Vitals: 


 











Temp Pulse Resp BP Pulse Ox


 


 97.4 F   84   18   155/81 H  100 


 


 11/19/18 00:52  11/19/18 00:52  11/19/18 00:52  11/19/18 00:52  11/19/18 00:52











Interpretation: Hypertensive


Notes: 





PHYSICAL EXAMINATION:





GENERAL: Appears uncomfortable, moving about the room.





HEAD: Atraumatic, normocephalic.





EYES: Pupils equal round and reactive to light, extraocular movements intact, 

sclera anicteric, conjunctiva are normal.





ENT: nares patent, oropharynx clear without exudates.  Moist mucous membranes.





NECK: Normal range of motion, supple without lymphadenopathy





LUNGS: Breath sounds clear to auscultation bilaterally and equal.  No wheezes 

rales or rhonchi.





HEART: Regular rate and rhythm without murmurs





ABDOMEN: Soft, left CVA tenderness but abdomen is otherwise nontender, 

normoactive bowel sounds.  No guarding, no rebound.  No masses appreciated.





: Normal testicular lie bilaterally.  No testicular tenderness to palpation.  

Positive cremasteric reflex bilaterally.





EXTREMITIES: Normal range of motion, no pitting or edema.  No cyanosis.





NEUROLOGICAL: No focal neurological deficits. Moves all extremities 

spontaneously and on command.





PSYCH: Mildly anxious, in pain





SKIN: Warm, Dry, normal turgor, no rashes or lesions noted.





Course





- Re-evaluation


Re-evalutation: 





11/19/18 01:09


Presents with findings consistent with acute nephrolithiasis.  Urinalysis does 

show hematuria.  Laboratory otherwise unremarkable.  Pain was able to be 

controlled here in the emergency department.  Patient is tolerating oral 

intake.  Clinical history is not consistent with an acute abdominal aneurysm or 

dissection, MI, or pulmonary embolus.  Urinalysis does not show findings 

consistent with an infected stone.  Vitals have remained within normal limits.  

At this time will discharge with return precautions and follow-up 

recommendations.  Verbal discharge instructions given a the bedside and 

opportunity for questions given. Medication warnings reviewed. Patient is in 

agreement with this plan and has verbalized understanding of return precautions 

and the need for urology follow-up in the next 24-72 hours.





- Vital Signs


Vital signs: 


 











Temp Pulse Resp BP Pulse Ox


 


 98.5 F   90   18   139/97 H  100 


 


 11/19/18 02:53  11/19/18 02:53  11/19/18 02:53  11/19/18 02:53  11/19/18 02:53














- Laboratory


Result Diagrams: 


 11/19/18 01:19





 11/19/18 01:19


Laboratory results interpreted by me: 


 











  11/19/18 11/19/18 11/19/18





  01:19 01:19 01:19


 


WBC  11.4 H  


 


Chloride   108 H 


 


Glucose   138 H 


 


Urine Protein    30 H


 


Urine Blood    LARGE H


 


Urine Urobilinogen    2.0 H














Discharge





- Discharge


Clinical Impression: 


 Kidney stone on left side, Lower abdominal pain





Condition: Good


Disposition: HOME, SELF-CARE


Additional Instructions: 


Your symptoms should improve over the course of the next one week.  If you 

continue to have pain for greater than one week or your pain is not controlled 

with the pain medications that you have been sent home with you need to return 

to the emergency department.  Please also return if you develop fever, 

persistent vomiting, or any other symptoms that are concerning to you.  You 

should take ibuprofen 600 mg every 6 hours and use the oral morphine as 

prescribed only for pain not controlled by ibuprofen.  You are also been sent 

home with a medication called Flomax to help pass the stone.  You've been given 

Zofran to assist with nausea.  Please follow-up with urology in the next 2-3 

days.


Prescriptions: 


Morphine Sulfate [Morphine Ir 15 mg Tablet] 15 mg PO Q6HP PRN #8 tablet


 PRN Reason: 


Ondansetron [Zofran Odt 4 mg Tablet] 1 - 2 tab PO Q4H PRN #15 tab.rapdis


 PRN Reason: For Nausea/Vomiting


Tamsulosin HCl [Flomax 0.4 mg Cap.sr] 0.4 mg PO DAILY #7 cap.sr.24h


Forms:  Return to Work

## 2019-07-29 ENCOUNTER — HOSPITAL ENCOUNTER (EMERGENCY)
Dept: HOSPITAL 62 - ER | Age: 34
Discharge: HOME | End: 2019-07-29
Payer: SELF-PAY

## 2019-07-29 VITALS — SYSTOLIC BLOOD PRESSURE: 124 MMHG | DIASTOLIC BLOOD PRESSURE: 72 MMHG

## 2019-07-29 DIAGNOSIS — Z87.442: ICD-10-CM

## 2019-07-29 DIAGNOSIS — Z86.14: ICD-10-CM

## 2019-07-29 DIAGNOSIS — H00.011: Primary | ICD-10-CM

## 2019-07-29 PROCEDURE — 99283 EMERGENCY DEPT VISIT LOW MDM: CPT

## 2019-07-29 NOTE — ER DOCUMENT REPORT
HPI





- HPI


Patient complains to provider of: R eye swelling


Time Seen by Provider: 07/29/19 09:26


Pain Level: 5


Context: 





33-year-old healthy male presents emergency department with chief complaint of 

right eye pain and swelling for the last 3 days.  Patient states that he noticed

it about 3 days ago and has some irritation and a little bit of swelling of his 

lower right eyelid that has been shifted to his upper eyelid.  Patient complains

of photophobia, blurred vision but no double vision, excessive tearing, said he 

had purulent discharge last night and this morning.  Left eye is not affected.  

No fevers or chills, no snack stiffness, no earache or sore throat.  No other 

complaints





- REPRODUCTIVE


Reproductive: DENIES: Pregnant:





Past Medical History





- Social History


Smoking Status: Unknown if Ever Smoked


Family History: Reviewed & Not Pertinent, Arthritis, CAD, DM, Hypertension, 

Other - end stage renal disease





- Past Medical History


Cardiac Medical History: 


   Denies: Hx Coronary Artery Disease, Hx Heart Attack, Hx Hypertension


Pulmonary Medical History: 


   Denies: Hx Asthma, Hx Bronchitis, Hx COPD, Hx Pneumonia


Neurological Medical History: Denies: Hx Cerebrovascular Accident, Hx Seizures


Endocrine Medical History: Denies: Hx Diabetes Mellitus Type 2


Renal/ Medical History: Reports: Hx Kidney Stones.  Denies: Hx Peritoneal 

Dialysis, Hx Renal Insufficiency


GI Medical History: Denies: Hx Gastritis, Hx Gastroesophageal Reflux Disease


Musculoskeletal Medical History: Denies Hx Arthritis


Skin Medical History: Reports Hx Eczema, Reports Hx MRSA


Past Surgical History: Reports: Hx Orthopedic Surgery





- Immunizations


Immunizations up to date: Yes


Hx Diphtheria, Pertussis, Tetanus Vaccination: Yes





Vertical Provider Document





- CONSTITUTIONAL


Notes: 





PHYSICAL EXAMINATION:





Reviewed vital signs and charting by RN





GENERAL: Alert, interacts well. No acute distress.


HEAD: Normocephalic, atraumatic.


EYES: Pupils equal and round. Extraocular movements intact.  Right upper eyelid 

swelling with evidence of a small hordeolum medial aspect of the lid margin, 

mild erythema but no scleral injection, no discharge


ENT: Oral mucosa moist, tongue midline. 


NECK: Full range of motion. Trachea midline.


EXTREMITIES: Moves all 4 extremities spontaneously. No edema,  No cyanosis.


PSYCH: Normal affect, normal mood.


SKIN: Warm, dry, normal turgor. No rashes or lesions noted.








- INFECTION CONTROL


TRAVEL OUTSIDE OF THE [place] IN LAST 30 DAYS: No





Course





- Re-evaluation


Re-evalutation: 





07/29/19 09:34


Overall well-appearing and afebrile, symptoms consistent with a hordeolum.  Plan

to perform forcing staining of the eye to assess for abrasion.


07/29/19 10:14


Floor seen staining did not show evidence of corneal abrasion.  Extraocular 

movements all intact, no concern for eye entrapment, no evidence of a septal or 

preseptal orbital cellulitis.  Plan is to place patient on erythromycin drops 

and aggressive hot compresses of the eye.  Patient given strict return 

precautions and instructions.  Stable for discharge





- Vital Signs


Vital signs: 


                                        











Temp Pulse Resp BP Pulse Ox


 


 97.8 F   89   16   124/72   98 


 


 07/29/19 09:12  07/29/19 09:12  07/29/19 09:12  07/29/19 09:12  07/29/19 09:12














Discharge





- Discharge


Clinical Impression: 


Hordeolum externum (stye)


Qualifiers:


 Laterality: right Eyelid: upper Qualified Code(s): H00.011 - Hordeolum externum

right upper eyelid





Condition: Good


Disposition: HOME, SELF-CARE


Additional Instructions: 


You were seen in the emergency department this morning for a condition called 

hordeolum which is also known as a stye of your eye.  Please continue to do hot 

compresses with a wash rag over the eye several times per day.  If the rag 

starts to cool down reheat it with hot water.  This will help soften it up and 

reduce the swelling.  Also, you have been given an antibiotic that you need to 

place in your eye.  Please use this 6-8 times per day for the next 5 to 7 days. 

If your eye becomes entrapped i.e. you are not able to move your eye around, you

develop fever, he develop significant swelling after using the antibiotics and 

hot compresses, you have complete vision loss, or you have any other concerning 

symptoms please immediately return to the emergency department.


Forms:  Return to Work